# Patient Record
Sex: FEMALE | Race: WHITE | NOT HISPANIC OR LATINO | Employment: UNEMPLOYED | URBAN - METROPOLITAN AREA
[De-identification: names, ages, dates, MRNs, and addresses within clinical notes are randomized per-mention and may not be internally consistent; named-entity substitution may affect disease eponyms.]

---

## 2018-05-24 ENCOUNTER — APPOINTMENT (OUTPATIENT)
Dept: WOMENS IMAGING | Facility: HOSPITAL | Age: 28
End: 2018-05-24

## 2018-05-24 PROCEDURE — 76641 ULTRASOUND BREAST COMPLETE: CPT | Performed by: RADIOLOGY

## 2018-05-24 PROCEDURE — 77066 DX MAMMO INCL CAD BI: CPT | Performed by: RADIOLOGY

## 2019-06-19 ENCOUNTER — OFFICE VISIT (OUTPATIENT)
Dept: PHYSICAL THERAPY | Facility: CLINIC | Age: 29
End: 2019-06-19

## 2019-06-19 ENCOUNTER — TRANSCRIBE ORDERS (OUTPATIENT)
Dept: PHYSICAL THERAPY | Facility: CLINIC | Age: 29
End: 2019-06-19

## 2019-06-19 DIAGNOSIS — S82.841D CLOSED BIMALLEOLAR FRACTURE OF RIGHT ANKLE WITH ROUTINE HEALING, SUBSEQUENT ENCOUNTER: Primary | ICD-10-CM

## 2019-06-19 DIAGNOSIS — S82.841A CLOSED DISPLACED BIMALLEOLAR FRACTURE OF RIGHT ANKLE, INITIAL ENCOUNTER: Primary | ICD-10-CM

## 2019-06-19 PROCEDURE — 97014 ELECTRIC STIMULATION THERAPY: CPT | Performed by: PHYSICAL THERAPIST

## 2019-06-19 PROCEDURE — 97140 MANUAL THERAPY 1/> REGIONS: CPT | Performed by: PHYSICAL THERAPIST

## 2019-06-19 NOTE — PROGRESS NOTES
Physical Therapy Daily Progress Note    VISIT#: 3  Subjective   Allison Casale reports: Sore, aching today. Was on feet a lot.      Objective   Treatment: Talocrural joint distraction prone  Posterior talocrural glides, anterior glides  subtalar joint mobilization.    See Exercise, Manual, and Modality Logs for complete treatment.     Patient Education:    Assessment & Plan     Assessment  Assessment details: Improved mobility, decreased pain after treatment.    Plan  Plan details: Continue          Progress per Plan of Care            Timed:         Manual Therapy:    8     mins  06495;     Therapeutic Exercise:         mins  71379;     Neuromuscular Cande:        mins  64869;    Therapeutic Activity:          mins  15115;     Gait Training:           mins  92422;     Ultrasound:          mins  61739;    Ionto                                   mins   80199  Self Care                            mins   88449  Canalith Repos                   mins  4209    Un-Timed:  Electrical Stimulation:    15     mins  05508 ( );  Dry Needling          mins self-pay  Traction          mins 76219  Low Eval          Mins  02817  Mod Eval          Mins  06195  High Eval                            Mins  08904  Re-Eval                               mins  88185    Timed Treatment:   8   mins   Total Treatment:     23   mins    Morales Enriquez, PT  Physical Therapist

## 2019-06-26 ENCOUNTER — OFFICE VISIT (OUTPATIENT)
Dept: PHYSICAL THERAPY | Facility: CLINIC | Age: 29
End: 2019-06-26

## 2019-06-26 DIAGNOSIS — S82.841D CLOSED BIMALLEOLAR FRACTURE OF RIGHT ANKLE WITH ROUTINE HEALING, SUBSEQUENT ENCOUNTER: Primary | ICD-10-CM

## 2019-06-26 PROCEDURE — 97140 MANUAL THERAPY 1/> REGIONS: CPT | Performed by: PHYSICAL THERAPIST

## 2019-06-26 PROCEDURE — 97110 THERAPEUTIC EXERCISES: CPT | Performed by: PHYSICAL THERAPIST

## 2019-06-26 NOTE — PROGRESS NOTES
Last Progress Note    Signed • Encounter Date: 6/19/2019 • Morales Enriquez, PT   Physical Therapy Daily Progress Note     VISIT#: 4  Subjective   Allison Casale reports: Doing much better today.      Objective   Treatment: Talocrural joint distraction prone  Posterior talocrural glides, anterior glides  subtalar joint mobilization.     See Exercise, Manual, and Modality Logs for complete treatment.      Patient Education:     Assessment & Plan      Assessment  Assessment details: Improved mobility, decreased pain after treatment.   Plan  Plan details: Continue            Progress per Plan of Care            Timed:                                                                          Manual Therapy:            8     mins  50358;                    Therapeutic Exercise:   10      mins  58365;     Neuromuscular Cande:        mins  78986;    Therapeutic Activity:           mins  07509;     Gait Training:                      mins  33089;     Ultrasound:                          mins  63853;    Ionto                                   mins   68157  Self Care                            mins   12761  Canalith Repos                   mins  4209     Un-Timed:  Electrical Stimulation:       mins  00346 ( );  Dry Needling                       mins self-pay  Traction                               mins 06548  Low Eval                             Mins  55203  Mod Eval                             Mins  37117  High Eval                            Mins  08323  Re-Eval                               mins  08385     Timed Treatment:  18   mins   Total Treatment:     18   mins     Morales Enriquez, PT  Physical Therapist

## 2019-07-03 ENCOUNTER — OFFICE VISIT (OUTPATIENT)
Dept: PHYSICAL THERAPY | Facility: CLINIC | Age: 29
End: 2019-07-03

## 2019-07-03 DIAGNOSIS — S82.841D CLOSED BIMALLEOLAR FRACTURE OF RIGHT ANKLE WITH ROUTINE HEALING, SUBSEQUENT ENCOUNTER: Primary | ICD-10-CM

## 2019-07-03 PROCEDURE — 97140 MANUAL THERAPY 1/> REGIONS: CPT | Performed by: PHYSICAL THERAPIST

## 2019-07-03 PROCEDURE — 97110 THERAPEUTIC EXERCISES: CPT | Performed by: PHYSICAL THERAPIST

## 2019-07-03 NOTE — PROGRESS NOTES
Physical Therapy Daily Progress Note    VISIT#: 3    Subjective   Allison Casale reports: Feeling better.  Ankle feels looser.      Objective     See Exercise, Manual, and Modality Logs for complete treatment.     Patient Education:    Assessment/Plan Improved mobility      Progress per Plan of Care            Timed:         Manual Therapy:    10     mins  74545;     Therapeutic Exercise:   25    mins  40873;     Neuromuscular Cande:        mins  28557;    Therapeutic Activity:          mins  42605;     Gait Training:           mins  89947;     Ultrasound:          mins  28040;    Ionto                                   mins   07375  Self Care                            mins   07827  Canalith Repos                   mins  4209    Un-Timed:  Electrical Stimulation:         mins  99468 ( );  Dry Needling          mins self-pay  Traction          mins 12978  Low Eval          Mins  12864  Mod Eval          Mins  08144  High Eval                            Mins  80465  Re-Eval                               mins  74100    Timed Treatment:   35   mins   Total Treatment:     35   mins    Morales Enriquez, PT  Physical Therapist

## 2019-07-17 ENCOUNTER — OFFICE VISIT (OUTPATIENT)
Dept: PHYSICAL THERAPY | Facility: CLINIC | Age: 29
End: 2019-07-17

## 2019-07-17 DIAGNOSIS — S82.841D CLOSED BIMALLEOLAR FRACTURE OF RIGHT ANKLE WITH ROUTINE HEALING, SUBSEQUENT ENCOUNTER: Primary | ICD-10-CM

## 2019-07-17 PROCEDURE — 97110 THERAPEUTIC EXERCISES: CPT | Performed by: PHYSICAL THERAPIST

## 2019-07-17 PROCEDURE — 97140 MANUAL THERAPY 1/> REGIONS: CPT | Performed by: PHYSICAL THERAPIST

## 2019-07-17 NOTE — PROGRESS NOTES
Physical Therapy Daily Progress Note    VISIT#: 4    Subjective   Allison Casale reports:Doing better.       Objective     See Exercise, Manual, and Modality Logs for complete treatment.     Patient Education:    Assessment/Plan  Improved gait with reinstruction     Progress per Plan of Care            Timed:         Manual Therapy:    8     mins  90580;     Therapeutic Exercise:    25     mins  06916;     Neuromuscular Cande:        mins  71753;    Therapeutic Activity:          mins  26890;     Gait Training:           mins  31043;     Ultrasound:          mins  59290;    Ionto                                   mins   28918  Self Care                            mins   29005  Canalith Repos                   mins  4209    Un-Timed:  Electrical Stimulation:         mins  83593 ( );  Dry Needling          mins self-pay  Traction          mins 94512  Low Eval          Mins  75820  Mod Eval          Mins  44909  High Eval                            Mins  57661  Re-Eval                               mins  41400    Timed Treatment:   33   mins   Total Treatment:     33   mins    Morales Enriquez, PT  Physical Therapist

## 2019-07-25 ENCOUNTER — OFFICE VISIT (OUTPATIENT)
Dept: PHYSICAL THERAPY | Facility: CLINIC | Age: 29
End: 2019-07-25

## 2019-07-25 DIAGNOSIS — S82.841D CLOSED BIMALLEOLAR FRACTURE OF RIGHT ANKLE WITH ROUTINE HEALING, SUBSEQUENT ENCOUNTER: Primary | ICD-10-CM

## 2019-07-25 PROCEDURE — 97110 THERAPEUTIC EXERCISES: CPT | Performed by: PHYSICAL THERAPIST

## 2019-07-25 PROCEDURE — 97116 GAIT TRAINING THERAPY: CPT | Performed by: PHYSICAL THERAPIST

## 2019-07-25 PROCEDURE — 97140 MANUAL THERAPY 1/> REGIONS: CPT | Performed by: PHYSICAL THERAPIST

## 2019-07-25 NOTE — PROGRESS NOTES
Physical Therapy Daily Progress Note    VISIT#: 5    Subjective   Allison Casale reports: Doing better      Objective     See Exercise, Manual, and Modality Logs for complete treatment.     Patient Education:    Assessment/Plan   Improved squat, lunge, but rapid fatigue. Requires cuing for correct foot position during gain and stairs      Progress per Plan of Care            Timed:         Manual Therapy:    10    mins  12253;     Therapeutic Exercise:    25     mins  52972;     Neuromuscular Cande:        mins  08571;    Therapeutic Activity:          mins  09285;     Gait Training:      15     mins  01064;     Ultrasound:          mins  17804;    Ionto                                   mins   72877  Self Care                            mins   62857  Canalith Repos                   mins  4209    Un-Timed:  Electrical Stimulation:         mins  17137 ( );  Dry Needling          mins self-pay  Traction          mins 93994  Low Eval          Mins  92249  Mod Eval          Mins  97556  High Eval                            Mins  58845  Re-Eval                               mins  45441    Timed Treatment:   50   mins   Total Treatment:     50   mins    Morales Enriquez, PT  Physical Therapist

## 2019-08-09 ENCOUNTER — OFFICE VISIT (OUTPATIENT)
Dept: PHYSICAL THERAPY | Facility: CLINIC | Age: 29
End: 2019-08-09

## 2019-08-09 DIAGNOSIS — S82.841D CLOSED BIMALLEOLAR FRACTURE OF RIGHT ANKLE WITH ROUTINE HEALING, SUBSEQUENT ENCOUNTER: Primary | ICD-10-CM

## 2019-08-09 PROCEDURE — 97140 MANUAL THERAPY 1/> REGIONS: CPT | Performed by: PHYSICAL THERAPIST

## 2019-08-09 PROCEDURE — 97110 THERAPEUTIC EXERCISES: CPT | Performed by: PHYSICAL THERAPIST

## 2019-08-09 NOTE — PROGRESS NOTES
Physical Therapy Daily Progress Note    VISIT#: 6    Subjective   Allison Casale reports: Stiff today. Back sore      Objective   AROM painful right lateral bending, positive SIJ forward bending and marching tests, decreased anterior rotation mobility.    CRS to pelvis with anterior rotation mobilization, L5-S1 distraction on right.  See Exercise, Manual, and Modality Logs for complete treatment.     Patient Education:    Assessment/Plan      Progress per Plan of Care            Timed:         Manual Therapy:    10     mins  81943;     Therapeutic Exercise:    30     mins  94668;     Neuromuscular Cande:        mins  81249;    Therapeutic Activity:          mins  12684;     Gait Training:           mins  65128;     Ultrasound:          mins  52598;    Ionto                                   mins   04495  Self Care                            mins   89838  Canalith Repos                   mins  4209    Un-Timed:  Electrical Stimulation:         mins  37735 ( );  Dry Needling          mins self-pay  Traction          mins 70891  Low Eval          Mins  09499  Mod Eval          Mins  93145  High Eval                            Mins  45562  Re-Eval                               mins  06852    Timed Treatment:   40   mins   Total Treatment:     40   mins    Morales Enriquez, PT  Physical Therapist

## 2019-10-10 ENCOUNTER — DOCUMENTATION (OUTPATIENT)
Dept: PHYSICAL THERAPY | Facility: CLINIC | Age: 29
End: 2019-10-10

## 2019-10-10 NOTE — PROGRESS NOTES
Discharge Summary  Discharge Summary from Physical Therapy Report      Number of Visits: 6     Discharge Status of Patient: Patient was last seen on 7/25/19.  She did not show for her last two scheduled visits.    Goals: Partially Met    Discharge Plan: Continue with current home exercise program as instructed      Date of Discharge: 10/10/19        Morales Enriquez, PT  Physical Therapist

## 2019-11-07 ENCOUNTER — TRANSCRIBE ORDERS (OUTPATIENT)
Dept: PHYSICAL THERAPY | Facility: CLINIC | Age: 29
End: 2019-11-07

## 2019-11-07 DIAGNOSIS — S82.841D CLOSED BIMALLEOLAR FRACTURE, RIGHT, WITH ROUTINE HEALING, SUBSEQUENT ENCOUNTER: Primary | ICD-10-CM

## 2019-11-20 ENCOUNTER — TREATMENT (OUTPATIENT)
Dept: PHYSICAL THERAPY | Facility: CLINIC | Age: 29
End: 2019-11-20

## 2019-11-20 DIAGNOSIS — S82.841D CLOSED BIMALLEOLAR FRACTURE OF RIGHT ANKLE WITH ROUTINE HEALING, SUBSEQUENT ENCOUNTER: Primary | ICD-10-CM

## 2019-11-20 PROCEDURE — 97110 THERAPEUTIC EXERCISES: CPT | Performed by: PHYSICAL THERAPIST

## 2019-11-20 PROCEDURE — 97140 MANUAL THERAPY 1/> REGIONS: CPT | Performed by: PHYSICAL THERAPIST

## 2019-11-20 NOTE — PROGRESS NOTES
"Re-Assessment / Re-Certification        Patient: Allison R Casale   : 1990  Diagnosis/ICD-10 Code:  Closed bimalleolar fracture of right ankle with routine healing, subsequent encounter [S82.841D]  Referring practitioner: Janes Dhaliwal MD  Date of Initial Visit: No linked episodes  Today's Date: 2019  Patient seen for Visit count could not be calculated. Make sure you are using a visit which is associated with an episode. sessions      Subjective:   Subjective Questionnaire: Foot and Ankle Ability Measure /  Subjective Evaluation    History of Present Illness  Mechanism of injury: Patient returns reporting that her ankle had an episode of \"locking\" that developed in Mid-October.  She reports she can warm it up and get it moving to improve the mobility.  She states that she has noticed that she is limping again, and her hip and back are getting more sore with the change in her walking.  She is scheduled for a f/u with Dr. Dhaliwal next week.      Patient Occupation: Business owner,  Quality of life: good    Pain  Current pain ratin  At best pain ratin  At worst pain ratin    Patient Goals  Patient goals for therapy: decreased pain and increased strength         Objective       Active Range of Motion     Right Ankle/Foot   Dorsiflexion (ke): 10 degrees   Plantar flexion: 45 degrees   Inversion: 28 degrees   Eversion: 12 degrees     Swelling   Left Ankle/Foot   Figure 8: 48.3 cm    Right Ankle/Foot   Figure 8: 48.8 cm     Assessment & Plan     Assessment  Impairments: abnormal coordination, abnormal gait, impaired balance and pain with function  Assessment details: Patient presents with recurrent ankle pain following ORIF.  She had recent flare up affecting her gait and mobility which is steadily improving.  She would benefit from reinstruction in her home program and manual therapy to improve accessory mobility to allow for better stretching at home.  Prognosis: " good    Goals  Plan Goals: 1. Increase ankle inversion to 35 degrees, eversion to 20 degrees in 6 weeks.  2. Independent with home program    Plan  Therapy options: will be seen for skilled physical therapy services  Other planned modality interventions: Physical Agents and Modalities PRN  Planned therapy interventions: manual therapy, strengthening, stretching, gait training and home exercise program  Duration in weeks: 8  Plan details: Treatment today consisted of talocrural and subtalar joint mobilizations, encouragement to continue movement, reinstruction in home exercise program consisting of passive DF, concentric and eccentric gastroc strengthening and inversion, eversion ROM in weightbearing position.            Recommendations: Continue as planned  Timeframe: 3 months  Prognosis to achieve goals: good    PT Signature: Morales Enriquez PT      Based upon review of the patient's progress and continued therapy plan, it is my medical opinion that Allison Casale should continue physical therapy treatment at [facility]  [address] [place] [address] [phone].    Signature: __________________________________  Janes Dhaliwal MD    Timed:         Manual Therapy:    10     mins  84267;     Therapeutic Exercise:    30     mins  18514;     Neuromuscular Cande:        mins  04964;    Therapeutic Activity:          mins  24141;     Gait Training:           mins  61354;     Ultrasound:          mins  10452;    Ionto                                   mins   50093  Self Care                            mins   85566  Canalith Repos                   mins  4209    Un-Timed:  Electrical Stimulation:         mins  27217 ( );  Dry Needling          mins self-pay  Traction          mins 29999  Low Eval          Mins  52369  Mod Eval          Mins  51761  High Eval                            Mins  21339  Re-Eval                               mins   68091      Timed Treatment:   40   mins   Total Treatment:     40   mins

## 2019-12-12 ENCOUNTER — TREATMENT (OUTPATIENT)
Dept: PHYSICAL THERAPY | Facility: CLINIC | Age: 29
End: 2019-12-12

## 2019-12-12 DIAGNOSIS — S82.841D CLOSED BIMALLEOLAR FRACTURE OF RIGHT ANKLE WITH ROUTINE HEALING, SUBSEQUENT ENCOUNTER: Primary | ICD-10-CM

## 2019-12-12 PROCEDURE — 97140 MANUAL THERAPY 1/> REGIONS: CPT | Performed by: PHYSICAL THERAPIST

## 2019-12-12 PROCEDURE — 97110 THERAPEUTIC EXERCISES: CPT | Performed by: PHYSICAL THERAPIST

## 2019-12-12 NOTE — PROGRESS NOTES
Physical Therapy Daily Progress Note    VISIT#: 1    Subjective   Allison Casale reports: Saw MD, said that removal of hardware was elective, can wait up to 6 months.      Objective     See Exercise, Manual, and Modality Logs for complete treatment.     Patient Education:    Assessment/Plan      Progress per Plan of Care            Timed:         Manual Therapy:    10     mins  35284;     Therapeutic Exercise:    25     mins  24456;     Neuromuscular Cande:        mins  74844;    Therapeutic Activity:          mins  03007;     Gait Training:           mins  65112;     Ultrasound:          mins  03076;    Ionto                                   mins   62133  Self Care                            mins   12466  Canalith Repos                   mins  4209    Un-Timed:  Electrical Stimulation:         mins  42552 ( );  Dry Needling          mins self-pay  Traction          mins 17177  Low Eval          Mins  83956  Mod Eval          Mins  59514  High Eval                            Mins  54247  Re-Eval                               mins  70205    Timed Treatment:   35   mins   Total Treatment:     35   mins    Morales Enriquez, PT  Physical Therapist

## 2020-06-24 ENCOUNTER — APPOINTMENT (OUTPATIENT)
Dept: WOMENS IMAGING | Facility: HOSPITAL | Age: 30
End: 2020-06-24

## 2020-06-24 PROCEDURE — 76641 ULTRASOUND BREAST COMPLETE: CPT | Performed by: RADIOLOGY

## 2020-06-24 PROCEDURE — 77066 DX MAMMO INCL CAD BI: CPT | Performed by: RADIOLOGY

## 2020-06-24 PROCEDURE — 77062 BREAST TOMOSYNTHESIS BI: CPT | Performed by: RADIOLOGY

## 2020-06-24 PROCEDURE — G0279 TOMOSYNTHESIS, MAMMO: HCPCS | Performed by: RADIOLOGY

## 2021-01-22 ENCOUNTER — IMMUNIZATION (OUTPATIENT)
Dept: VACCINE CLINIC | Facility: HOSPITAL | Age: 31
End: 2021-01-22

## 2021-01-22 PROCEDURE — 91300 HC SARSCOV02 VAC 30MCG/0.3ML IM: CPT | Performed by: FAMILY MEDICINE

## 2021-01-22 PROCEDURE — 0001A: CPT | Performed by: FAMILY MEDICINE

## 2021-02-12 ENCOUNTER — IMMUNIZATION (OUTPATIENT)
Dept: VACCINE CLINIC | Facility: HOSPITAL | Age: 31
End: 2021-02-12

## 2021-02-12 PROCEDURE — 0002A: CPT | Performed by: INTERNAL MEDICINE

## 2021-02-12 PROCEDURE — 91300 HC SARSCOV02 VAC 30MCG/0.3ML IM: CPT | Performed by: INTERNAL MEDICINE

## 2021-04-09 ENCOUNTER — OFFICE VISIT (OUTPATIENT)
Dept: INTERNAL MEDICINE | Facility: CLINIC | Age: 31
End: 2021-04-09

## 2021-04-09 VITALS
BODY MASS INDEX: 35.51 KG/M2 | RESPIRATION RATE: 14 BRPM | TEMPERATURE: 97.4 F | SYSTOLIC BLOOD PRESSURE: 128 MMHG | WEIGHT: 208 LBS | HEIGHT: 64 IN | HEART RATE: 73 BPM | DIASTOLIC BLOOD PRESSURE: 72 MMHG | OXYGEN SATURATION: 98 %

## 2021-04-09 DIAGNOSIS — Z13.228 SCREENING FOR METABOLIC DISORDER: ICD-10-CM

## 2021-04-09 DIAGNOSIS — Z13.6 SCREENING FOR HYPERTENSION: ICD-10-CM

## 2021-04-09 DIAGNOSIS — Z13.220 ENCOUNTER FOR LIPID SCREENING FOR CARDIOVASCULAR DISEASE: ICD-10-CM

## 2021-04-09 DIAGNOSIS — Z13.6 ENCOUNTER FOR LIPID SCREENING FOR CARDIOVASCULAR DISEASE: ICD-10-CM

## 2021-04-09 DIAGNOSIS — Z00.00 ANNUAL PHYSICAL EXAM: Primary | ICD-10-CM

## 2021-04-09 DIAGNOSIS — Z11.59 NEED FOR HEPATITIS C SCREENING TEST: ICD-10-CM

## 2021-04-09 PROCEDURE — 99385 PREV VISIT NEW AGE 18-39: CPT | Performed by: NURSE PRACTITIONER

## 2021-04-09 RX ORDER — PROPRANOLOL HYDROCHLORIDE 10 MG/1
TABLET ORAL
COMMUNITY
Start: 2021-03-02

## 2021-04-09 RX ORDER — BUPROPION HYDROCHLORIDE 300 MG/1
TABLET ORAL DAILY
COMMUNITY
Start: 2021-04-06

## 2021-04-09 NOTE — PROGRESS NOTES
Chief Complaint  Establish Care     Subjective:      History of Present Illness {CC  Problem List  Visit  Diagnosis   Encounters  Notes  Medications  Labs  Result Review Imaging  Media :23}     Allison R CASALE PRICE presents to [facility] for annual exam - excluding GYN exam.     She is new to me and [facility].   She is about to move to [facility] and would like preventative exam.     ADHD in youth: used to take adderall  Currently takes wellbutrin (last 2 months)  for anxiety/depression and states it is well controlled.   1 1/2 year therapy  Dr Dion Harris at times - propranolol is effective    GYN:   Prior contraceptive: Trista,  She has had IUD since living in [place].   left breast has mass that she has chronically monitored.   She is seen by [facility] routinely.     Surgical history  Skin graft to the chin at 15 years of age  R tib/fib ORIF (fx 2/2 pivot twist injury)  - improved.     Family history:  Mother: Thyroid cancer, adhd, Father history of skin cancer (melanoma), maternal great uncle with breast cancer diagnosed in his 20s    History of smoking 1/2 pack/day for 3 years.  Quit in 2012.    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      - no children     Colon cancer screen:   No family history.   She will be due to colonoscopy at 45 yoa unless indicated earlier.      Objective:      Physical Exam  Vitals reviewed.   Constitutional:       Appearance: Normal appearance. She is well-developed and normal weight.   HENT:      Head: Normocephalic.      Right Ear: Tympanic membrane normal.      Left Ear: Tympanic membrane normal.      Nose: Nose normal.      Mouth/Throat:      Pharynx: Uvula midline.   Eyes:      Conjunctiva/sclera: Conjunctivae normal.      Pupils: Pupils are equal, round, and reactive to light.   Neck:      Thyroid: No thyromegaly.   Cardiovascular:       "Rate and Rhythm: Normal rate and regular rhythm.      Pulses: Normal pulses.      Heart sounds: Normal heart sounds, S1 normal and S2 normal. No murmur heard.     Pulmonary:      Effort: Pulmonary effort is normal.      Breath sounds: Normal breath sounds.   Chest:      Chest wall: No deformity.   Abdominal:      General: Bowel sounds are normal.      Palpations: Abdomen is soft.      Tenderness: There is no abdominal tenderness. Negative signs include Chacon's sign.   Genitourinary:     Comments: Deferred - GYN   Musculoskeletal:         General: Normal range of motion.      Cervical back: Normal range of motion and neck supple.      Right lower leg: No edema.      Left lower leg: No edema.   Lymphadenopathy:      Cervical: No cervical adenopathy.   Skin:     General: Skin is warm and dry.      Capillary Refill: Capillary refill takes 2 to 3 seconds.   Neurological:      General: No focal deficit present.      Mental Status: She is alert and oriented to person, place, and time.      Cranial Nerves: No cranial nerve deficit.      Sensory: No sensory deficit.      Motor: No weakness.      Coordination: Coordination normal.      Gait: Gait normal.   Psychiatric:         Mood and Affect: Mood normal.         Speech: Speech normal.         Behavior: Behavior normal. Behavior is cooperative.         Thought Content: Thought content normal.         Judgment: Judgment normal.        Result Review  Data Reviewed:{ Labs  Result Review  Imaging  Med Tab  Media :23}            Vital Signs:   /72 (BP Location: Left arm, Patient Position: Sitting, Cuff Size: Adult)   Pulse 73   Temp 97.4 °F (36.3 °C)   Resp 14   Ht 162.6 cm (64\")   Wt 94.3 kg (208 lb)   SpO2 98%   BMI 35.70 kg/m²         Requested Prescriptions      No prescriptions requested or ordered in this encounter     Routine medications provided by this office will also be refilled via pharmacy request.       Current Outpatient Medications:   •  " buPROPion XL (WELLBUTRIN XL) 300 MG 24 hr tablet, Daily., Disp: , Rfl:   •  propranolol (INDERAL) 10 MG tablet, As needed for panic attacks, Disp: , Rfl:      Assessment and Plan:      Assessment and Plan {CC Problem List  Visit Diagnosis  ROS  Review (Popup)  Health Maintenance  Quality  BestPractice  Medications  SmartSets  SnapShot Encounters  Media :23}     Problem List Items Addressed This Visit     None      Visit Diagnoses     Annual physical exam    -  Primary    Relevant Orders    Comprehensive Metabolic Panel    Lipid Panel With LDL / HDL Ratio    CBC (No Diff)    TSH    T4, free    Hepatitis C Antibody    Vitamin D 25 hydroxy    Need for hepatitis C screening test        Relevant Orders    Hepatitis C Antibody    Screening for metabolic disorder        Relevant Orders    TSH    T4, free    Vitamin D 25 hydroxy    Screening for hypertension        Blood pressure is controlled.   Maintain low salt diet. Regualr exercise.     Encounter for lipid screening for cardiovascular disease        Relevant Orders    Comprehensive Metabolic Panel    Lipid Panel With LDL / HDL Ratio        Overall, she is appears very healthy.    She can't recall having though labs for exam - will check today.   I will notify her of results via [employer].     Follow Up {Instructions Charge Capture  Follow-up Communications :23}     She is moving to [facility] - but obviously, if she needs anything whilst here, happy to see her back.     Patient was given instructions and counseling regarding her condition or for health maintenance advice. Please see specific information pulled into the AVS if appropriate.    Bekah disclaimer:   Much of this encounter note is an electronic transcription/translation of spoken language to printed text. The electronic translation of spoken language may permit erroneous, or at times, nonsensical words or phrases to be inadvertently transcribed; Although I have reviewed the note for such  errors, some may still exist.     Additional Patient Counseling:       Patient Instructions     It's Nearly Summer Time!    Stay hydrated when outside  If your urine starts to get darker, you are not drinking enough     Protect yourself from ticks and mosquitos  Here are the best ingredients to look for:  · DEET (N,N-diethyl-m-toluamide or N,N-diethyl-3-methyl-benzamide)  · Picaridin  · Oil of lemon eucalyptus (p-menthane-3,8-diol or PMD)  Wear light-colored pants so you can spot ticks easier  If you use a permethrin product, ONLY apply to clothing    Practice Safe Sun!    · Use sun screen SPF >30 daily, reapply regularly per directions on package  · See dermatologist for skin check regularly  · Protect your eyes with sunglasses with UV protection    Poison Ivy  If you are going into areas that may have poison ivy, prepare with a product like IvyX or other ivy blocker.  Wash your clothes and pets after being in an area with ivy when returning home. If you come in contact with poison ivy, try a product like Technu     Other things you should incorporate all year...     Diet:    • Eat vegetables, fruits, whole grain, low-fat dairy, poultry, fish, beans, nontropical vegetable oils, and nuts, but avoid red meat (i.e., Mediterranean-style diet, DASH [Dietary Approaches to Stop Hypertension] diet).  • Limit sugary drinks and sweets.  • Limit saturated and trans fat to 5% to 6% of calories.  • Limit sodium intake to 2,400 mg daily (about one teaspoon table salt [kosher/sea salt have less sodium per teaspoon]).  Weight loss / Calorie Counting Apps:    • Lose It!   • My[employer] Online Pal   • Works great when you try it with a partner/ friend. It takes about 15 minutes a day but studies show that this simple method of monitoring your intake can help you achieve goals as it keeps you accountable.  I often will ask patients to try these apps just to get an idea of how much sodium and how many carbohydrates you are taking in.    Exercise:   • Engage in moderate-to-vigorous aerobic activity for at least 40 minutes (on average) three to four times each week.  Wearables:   • Activity tracker   • Step tracker: getting 7,500 steps daily can cut your cardiac risks by 44%   Bone Health:   • [email or web address].org/patients/treatment/nutrition/  • Routine weight bearing exercise  •

## 2021-04-09 NOTE — PATIENT INSTRUCTIONS
It's Nearly Summer Time!    Stay hydrated when outside  If your urine starts to get darker, you are not drinking enough     Protect yourself from ticks and mosquitos  Here are the best ingredients to look for:  · DEET (N,N-diethyl-m-toluamide or N,N-diethyl-3-methyl-benzamide)  · Picaridin  · Oil of lemon eucalyptus (p-menthane-3,8-diol or PMD)  Wear light-colored pants so you can spot ticks easier  If you use a permethrin product, ONLY apply to clothing    Practice Safe Sun!    · Use sun screen SPF >30 daily, reapply regularly per directions on package  · See dermatologist for skin check regularly  · Protect your eyes with sunglasses with UV protection    Poison Ivy  If you are going into areas that may have poison ivy, prepare with a product like IvyX or other ivy blocker.  Wash your clothes and pets after being in an area with ivy when returning home. If you come in contact with poison ivy, try a product like Technu     Other things you should incorporate all year...     Diet:    • Eat vegetables, fruits, whole grain, low-fat dairy, poultry, fish, beans, nontropical vegetable oils, and nuts, but avoid red meat (i.e., Mediterranean-style diet, DASH [Dietary Approaches to Stop Hypertension] diet).  • Limit sugary drinks and sweets.  • Limit saturated and trans fat to 5% to 6% of calories.  • Limit sodium intake to 2,400 mg daily (about one teaspoon table salt [kosher/sea salt have less sodium per teaspoon]).  Weight loss / Calorie Counting Apps:    • Lose It!   • MyFitBlownaway Pal   • Works great when you try it with a partner/ friend. It takes about 15 minutes a day but studies show that this simple method of monitoring your intake can help you achieve goals as it keeps you accountable.  I often will ask patients to try these apps just to get an idea of how much sodium and how many carbohydrates you are taking in.   Exercise:   • Engage in moderate-to-vigorous aerobic activity for at least 40 minutes (on average)  three to four times each week.  Wearables:   • Activity tracker   • Step tracker: getting 7,500 steps daily can cut your cardiac risks by 44%   Bone Health:   • [email or web address]  • Routine weight bearing exercise  •

## 2021-04-10 LAB
25(OH)D3+25(OH)D2 SERPL-MCNC: 29.5 NG/ML (ref 30–100)
ALBUMIN SERPL-MCNC: 4.3 G/DL (ref 3.5–5.2)
ALBUMIN/GLOB SERPL: 1.7 G/DL
ALP SERPL-CCNC: 71 U/L (ref 39–117)
ALT SERPL-CCNC: 16 U/L (ref 1–33)
AST SERPL-CCNC: 15 U/L (ref 1–32)
BILIRUB SERPL-MCNC: 0.3 MG/DL (ref 0–1.2)
BUN SERPL-MCNC: 16 MG/DL (ref 6–20)
BUN/CREAT SERPL: 19 (ref 7–25)
CALCIUM SERPL-MCNC: 9.3 MG/DL (ref 8.6–10.5)
CHLORIDE SERPL-SCNC: 103 MMOL/L (ref 98–107)
CHOLEST SERPL-MCNC: 191 MG/DL (ref 0–200)
CO2 SERPL-SCNC: 27.2 MMOL/L (ref 22–29)
CREAT SERPL-MCNC: 0.84 MG/DL (ref 0.57–1)
ERYTHROCYTE [DISTWIDTH] IN BLOOD BY AUTOMATED COUNT: 11.7 % (ref 12.3–15.4)
GLOBULIN SER CALC-MCNC: 2.6 GM/DL
GLUCOSE SERPL-MCNC: 74 MG/DL (ref 65–99)
HCT VFR BLD AUTO: 40 % (ref 34–46.6)
HCV AB S/CO SERPL IA: <0.1 S/CO RATIO (ref 0–0.9)
HDLC SERPL-MCNC: 52 MG/DL (ref 40–60)
HGB BLD-MCNC: 13.9 G/DL (ref 12–15.9)
LDLC SERPL CALC-MCNC: 121 MG/DL (ref 0–100)
LDLC/HDLC SERPL: 2.29 {RATIO}
MCH RBC QN AUTO: 30.3 PG (ref 26.6–33)
MCHC RBC AUTO-ENTMCNC: 34.8 G/DL (ref 31.5–35.7)
MCV RBC AUTO: 87.3 FL (ref 79–97)
PLATELET # BLD AUTO: 361 10*3/MM3 (ref 140–450)
POTASSIUM SERPL-SCNC: 4.4 MMOL/L (ref 3.5–5.2)
PROT SERPL-MCNC: 6.9 G/DL (ref 6–8.5)
RBC # BLD AUTO: 4.58 10*6/MM3 (ref 3.77–5.28)
SODIUM SERPL-SCNC: 139 MMOL/L (ref 136–145)
T4 FREE SERPL-MCNC: 1.15 NG/DL (ref 0.93–1.7)
TRIGL SERPL-MCNC: 100 MG/DL (ref 0–150)
TSH SERPL DL<=0.005 MIU/L-ACNC: 1.35 UIU/ML (ref 0.27–4.2)
VLDLC SERPL CALC-MCNC: 18 MG/DL (ref 5–40)
WBC # BLD AUTO: 11.09 10*3/MM3 (ref 3.4–10.8)